# Patient Record
Sex: FEMALE | Race: AMERICAN INDIAN OR ALASKA NATIVE | NOT HISPANIC OR LATINO | URBAN - METROPOLITAN AREA
[De-identification: names, ages, dates, MRNs, and addresses within clinical notes are randomized per-mention and may not be internally consistent; named-entity substitution may affect disease eponyms.]

---

## 2017-01-08 ENCOUNTER — OFFICE VISIT - RIVER FALLS (OUTPATIENT)
Dept: FAMILY MEDICINE | Facility: CLINIC | Age: 22
End: 2017-01-08

## 2017-01-08 ASSESSMENT — MIFFLIN-ST. JEOR: SCORE: 1755.59

## 2022-02-12 VITALS
HEIGHT: 67 IN | TEMPERATURE: 98.2 F | SYSTOLIC BLOOD PRESSURE: 122 MMHG | HEART RATE: 82 BPM | BODY MASS INDEX: 33.84 KG/M2 | WEIGHT: 215.6 LBS | DIASTOLIC BLOOD PRESSURE: 80 MMHG

## 2022-02-15 NOTE — PROGRESS NOTES
Patient:   ROSALIA PRIETO            MRN: [id number]            FIN: [id number]               Age:   21 years     Sex:  Female     :  1995   Associated Diagnoses:   Acute sinusitis   Author:   Saniya Ramsey      Chief Complaint   2017 10:13 AM CST    Pt here for sore throat, possible pink eye in both eyes, nasal congestion, headaches and coughing x 1 month.              History of Present Illness             The patient presents with sinus problem, doesn't hardly ever get sit, cant remember last antibiotic use.  The sinus problem is characterized by nasal congestion and headache.  The severity of the sinus problem is moderate.  The sinus problem is worsening.  The sinus problem has lasted for 6 week(s).  The context of the sinus problem: occurred in association with illness and seemed to improved but then worse again.  Exacerbating factors consist of allergen exposure.  Relieving factors consist of fluids and mucinex used but ran out.  Associated symptoms consist of cough, ear pain, sore throat and denies fever.         New pat. [employer] student. Non smoker  PMH--wisdom teeth extraction, no asthma, no allergy hx.        Review of Systems            Health Status   Allergies:    Allergic Reactions (Selected)  No Known Medication Allergies   Medications:  (Selected)      Problem list:    All Problems  Obesity / SNOMED CT [id number] / Probable      Histories   Past Medical History:    No active or resolved past medical history items have been selected or recorded.   Family History:    No family history items have been selected or recorded.   Procedure history:    No active procedure history items have been selected or recorded.   Social History:             No active social history items have been recorded.      Physical Examination   Vital Signs   2017 10:13 AM CST Temperature Tympanic 98.2 DegF    Peripheral Pulse Rate 82 bpm    Pulse Site Radial artery    Systolic Blood Pressure 122 mmHg    Diastolic  Blood Pressure 80 mmHg    Mean Arterial Pressure 94 mmHg    BP Site Right arm      Measurements from flowsheet : Measurements   1/8/2017 10:13 AM CST Height Measured - Standard 67 in    Weight Measured - Standard 215.6 lb    BSA 2.15 m2    Body Mass Index 33.76 kg/m2      General:  Alert and oriented, No acute distress.    Eye:  Normal conjunctiva.    HENT:  Tympanic membranes are clear, Normal hearing, Oral mucosa is moist, injected post oropharynx.    Neck:  Non-tender, No lymphadenopathy.    Respiratory:  Lungs are clear to auscultation, Respirations are non-labored, Breath sounds are equal.    Cardiovascular:  Normal rate, Regular rhythm, No murmur.    Integumentary:  Warm, Dry, Pink, No rash.    Neurologic:  Alert, Oriented.    Psychiatric:  Cooperative, Appropriate mood & affect.       Impression and Plan   Diagnosis     Acute sinusitis ([id number] J01.90).     Orders     Orders (Selected)   Prescriptions  Prescribed  Amoxil 875 mg oral tablet: 1 tab(s) ( 875 mg ), PO, BID, # 28 tab(s), 0 Refill(s), Type: Maintenance, Pharmacy: [facility] [address], 1 tab(s) po bid,x14 day(s)  Atrovent 42 mcg/inh nasal spray: 2 spray(s), Nasal, QID, x 30 day(s), PRN: nasal drainage, # 1 EA, 0 Refill(s), Type: Acute, Pharmacy: [facility] [address], 2 spray(s) nasal qid,x30 day(s),PRN:nasal drainage.     Use a mucolytic (like guaifenesin/Mucinex/Robitussin) and saline nasal spray, a decongestant (pseudoephedrine--kept behind the counter--if not contraindicated), return if not better.  If you were prescribed an antibiotic,  a probiotic at the pharmacy to help prevent diarrhea and yeast infections, antibiotics can cause this.      .